# Patient Record
Sex: MALE | Race: WHITE
[De-identification: names, ages, dates, MRNs, and addresses within clinical notes are randomized per-mention and may not be internally consistent; named-entity substitution may affect disease eponyms.]

---

## 2017-05-28 ENCOUNTER — HOSPITAL ENCOUNTER (EMERGENCY)
Dept: HOSPITAL 26 - MED | Age: 27
LOS: 1 days | Discharge: HOME | End: 2017-05-29
Payer: COMMERCIAL

## 2017-05-28 VITALS — HEIGHT: 71 IN | WEIGHT: 218 LBS | BODY MASS INDEX: 30.52 KG/M2

## 2017-05-28 VITALS — DIASTOLIC BLOOD PRESSURE: 78 MMHG | SYSTOLIC BLOOD PRESSURE: 131 MMHG

## 2017-05-28 DIAGNOSIS — R31.9: Primary | ICD-10-CM

## 2017-05-28 DIAGNOSIS — R30.9: ICD-10-CM

## 2017-05-29 VITALS — SYSTOLIC BLOOD PRESSURE: 128 MMHG | DIASTOLIC BLOOD PRESSURE: 72 MMHG

## 2017-05-29 NOTE — NUR
PATIENT PRESENTS TO ED WITH DUE TO BLOOD IN  URINE . PT STATES IT HAPPENS TWICE 
YESTERDAY AND THIS MORNING. DENIES N/V/D; SKIN IS PINK/WARM/DRY; AAOX4 WITH 
EVEN AND STEADY GAIT; LUNGS CLEAR BL; HR EVEN AND REGULAR; PT DENIES ANY FEVER, 
CP, SOB, OR COUGH AT THIS TIME; PATIENT STATES PAIN OF 0/10 AT THIS TIME;  
PATIENT SITTING IN CHAIR AT THIS TIME.PT AAO, NO DISTRESS NOTED ,NO ABDOMINAL 
DISTENTION NOTED, LAST BM 5/28/17

## 2019-12-10 ENCOUNTER — HOSPITAL ENCOUNTER (EMERGENCY)
Dept: HOSPITAL 1 - ED | Age: 29
Discharge: HOME | End: 2019-12-10
Payer: COMMERCIAL

## 2019-12-10 VITALS — HEIGHT: 71 IN | BODY MASS INDEX: 30.52 KG/M2 | WEIGHT: 218 LBS

## 2019-12-10 VITALS — SYSTOLIC BLOOD PRESSURE: 119 MMHG | DIASTOLIC BLOOD PRESSURE: 61 MMHG

## 2019-12-10 DIAGNOSIS — J98.01: ICD-10-CM

## 2019-12-10 DIAGNOSIS — J10.1: Primary | ICD-10-CM

## 2020-08-12 ENCOUNTER — HOSPITAL ENCOUNTER (EMERGENCY)
Dept: HOSPITAL 1 - ED | Age: 30
LOS: 1 days | Discharge: HOME | End: 2020-08-13
Payer: COMMERCIAL

## 2020-08-12 VITALS — HEIGHT: 71 IN | WEIGHT: 233 LBS | BODY MASS INDEX: 32.62 KG/M2

## 2020-08-12 VITALS — SYSTOLIC BLOOD PRESSURE: 120 MMHG | DIASTOLIC BLOOD PRESSURE: 70 MMHG

## 2020-08-12 DIAGNOSIS — U07.1: Primary | ICD-10-CM

## 2020-11-08 ENCOUNTER — HOSPITAL ENCOUNTER (EMERGENCY)
Dept: HOSPITAL 1 - ED | Age: 30
Discharge: HOME | End: 2020-11-08
Payer: COMMERCIAL

## 2020-11-08 VITALS
BODY MASS INDEX: 32.93 KG/M2 | BODY MASS INDEX: 32.93 KG/M2 | HEIGHT: 70 IN | WEIGHT: 230 LBS | WEIGHT: 230 LBS | HEIGHT: 70 IN

## 2020-11-08 VITALS — SYSTOLIC BLOOD PRESSURE: 117 MMHG | DIASTOLIC BLOOD PRESSURE: 80 MMHG

## 2020-11-08 DIAGNOSIS — R11.10: Primary | ICD-10-CM

## 2020-11-08 DIAGNOSIS — R53.83: ICD-10-CM
